# Patient Record
Sex: MALE | Race: WHITE | URBAN - METROPOLITAN AREA
[De-identification: names, ages, dates, MRNs, and addresses within clinical notes are randomized per-mention and may not be internally consistent; named-entity substitution may affect disease eponyms.]

---

## 2017-07-26 ENCOUNTER — HISTORICAL (OUTPATIENT)
Dept: SURGERY | Facility: HOSPITAL | Age: 67
End: 2017-07-26

## 2017-07-26 LAB
APTT PPP: 28.7 SECOND(S) (ref 25–35)
INR PPP: 1 (ref 0–1.2)
PROTHROMBIN TIME: 10.4 SECOND(S) (ref 9–12)

## 2017-07-28 ENCOUNTER — HISTORICAL (OUTPATIENT)
Dept: ANESTHESIOLOGY | Facility: HOSPITAL | Age: 67
End: 2017-07-28

## 2022-04-10 ENCOUNTER — HISTORICAL (OUTPATIENT)
Dept: ADMINISTRATIVE | Facility: HOSPITAL | Age: 72
End: 2022-04-10

## 2022-04-28 VITALS
OXYGEN SATURATION: 97 % | DIASTOLIC BLOOD PRESSURE: 86 MMHG | WEIGHT: 201.06 LBS | SYSTOLIC BLOOD PRESSURE: 145 MMHG | HEIGHT: 69 IN | BODY MASS INDEX: 29.78 KG/M2

## 2022-04-30 NOTE — OP NOTE
Patient:   Roni Pozo             MRN: [id number]            FIN: [id number]               Age:   66 years     Sex:  Male     :  1950   Associated Diagnoses:   Lipoma of forehead   Author:   Tamara Kaye MD      Operative Note   Operative Information   Date/ Time:  2017 17:35:00.     Procedures Performed: Procedure Code   Excision, tumor, soft tissue of face or scalp, subcutaneous; 2 cm or greater (09999)..     Indications: 64-year-old white male with enlarging subcutaneous mass of the right scalp and forehead.     Preoperative Diagnosis: Lipoma of forehead ([id number]-JR D17.0).     Postoperative Diagnosis: Lipoma of forehead ([id number] D17.0).     Surgeon: Tamara Kaye MD.     Anesthesia: Gen..     Speciman Removed: 3 x 2 cm lipoma.     Description of Procedure/Findings/    Complications: procedure in detail:  Patient was brought to the operative theater laid in the supine position general endotracheal intubation anesthesia was provided. Preoperative antibiotics administered. The area of the right face and head were sterilely prepped and draped in normal surgical fashion. A mobile subcutaneous lesion identified over the right orbit was evaluated. Horizontal incision was chosen overlying the midline included within the skin crease of the forehead. An incision was taken down through the subcutaneous tissue using a #15 blade. The subcutaneous lipoma was encountered and dissected circumferentially. A portion of this lipomatous mass laid in the subfascial space required dissection of the frontalis muscle using Metzenbaum scissors. lipomatous mass measured approximately 3 x 2 cm x 1.5 cm thick and was sent to pathology for evaluation.  upon complete circumferential dissection cavity was made hemostatic using Bovie cauterization.  the cavity was then closed in layered fashion using 3-0 Vicryl and the skin reapproximated using running 4-0 subcuticular Monocryl.  A sterile dressing was then placed  over the wound the patient was relieved anesthesia in a stable condition transferred to postanesthesia care unit..     Esimated blood loss: loss  5  cc.     Complications: None.